# Patient Record
Sex: MALE | Race: WHITE | NOT HISPANIC OR LATINO | URBAN - METROPOLITAN AREA
[De-identification: names, ages, dates, MRNs, and addresses within clinical notes are randomized per-mention and may not be internally consistent; named-entity substitution may affect disease eponyms.]

---

## 2019-08-13 ENCOUNTER — OFFICE (OUTPATIENT)
Dept: URBAN - METROPOLITAN AREA CLINIC 11 | Facility: CLINIC | Age: 29
End: 2019-08-13

## 2019-08-13 VITALS
HEIGHT: 68 IN | SYSTOLIC BLOOD PRESSURE: 138 MMHG | DIASTOLIC BLOOD PRESSURE: 92 MMHG | WEIGHT: 118 LBS | HEART RATE: 70 BPM

## 2019-08-13 DIAGNOSIS — R63.0 ANOREXIA: ICD-10-CM

## 2019-08-13 DIAGNOSIS — R63.4 ABNORMAL WEIGHT LOSS: ICD-10-CM

## 2019-08-13 DIAGNOSIS — B19.20 UNSPECIFIED VIRAL HEPATITIS C WITHOUT HEPATIC COMA: ICD-10-CM

## 2019-08-13 DIAGNOSIS — R11.0 NAUSEA: ICD-10-CM

## 2019-08-13 LAB
HCV RNA BY PCR, QN RFX GENO: HCV GENOTYPE: (no result)
HCV RNA BY PCR, QN RFX GENO: HCV LOG10: 6.16 LOG10 IU/ML
HCV RNA BY PCR, QN RFX GENO: HEPATITIS C QUANTITATION: (no result) IU/ML
HCV RNA BY PCR, QN RFX GENO: TEST INFORMATION: (no result)
HEPATITIS C GENOTYPE: (no result)
HEPATITIS C GENOTYPE: PLEASE NOTE: (no result)
PANEL 083935: HIV SCREEN 4TH GENERATION WRFX: NON REACTIVE

## 2019-08-13 PROCEDURE — 99203 OFFICE O/P NEW LOW 30 MIN: CPT | Performed by: NURSE PRACTITIONER

## 2019-08-13 NOTE — SERVICEHPINOTES
Zoran Galvan   is a   29   year old  male   here today at the request of Dr. Padron for evaluation of a positive hepatitis C antibody.  The patient presented to [facility] emergency room 7/25/19 due to dizziness and low blood pressure. During that ER visit, he was noted to have elevated liver enzymes with an ALT of 124 and an AST of 54. No specific diagnosis was made at that time but he has seen a primary care provider subsequently and had additional labs that showed an ALT of 104 and an AST of 42. His platelet count was 235. He also had a positive hepatitis C antibody.  His mother notes that he was previously vaccinated for hepatitis-B.  The patient admits to having a tattoo placed at a non licensed parlor about 10 or 15 years ago and began using IV drugs 10 years ago.  He stopped all IV drugs about 2 years ago.  He denies any history of blood transfusions or at risk sexual behaviors.  There is no family history of chronic liver disease. He cannot recall when he last had laboratory studies drawn but has never been previously told his liver enzymes were elevated.  He continues to experience the dizziness that brought him to the emergency room and reportedly had an echocardiogram that was negative.  He has not seen a cardiologist.  He notes ringing in his ears and nausea when his stomach is empty and also when he 1st starts eating.  He describes anorexia and has lost more than 10 lb over the last 2 weeks.  He denies heartburn, reflux or dysphagia.  He denies any change in his bowel habits, melena or hematochezia.  There is no family history of colon cancer polyps or inflammatory bowel disease. The patient stopped smoking cigarettes but does continue to vape.

## 2020-09-30 ENCOUNTER — OFFICE (OUTPATIENT)
Dept: URBAN - METROPOLITAN AREA CLINIC 14 | Facility: CLINIC | Age: 30
End: 2020-09-30

## 2021-01-04 ENCOUNTER — OFFICE (OUTPATIENT)
Dept: URBAN - METROPOLITAN AREA CLINIC 14 | Facility: CLINIC | Age: 31
End: 2021-01-04

## 2021-01-07 ENCOUNTER — OFFICE (OUTPATIENT)
Dept: URBAN - METROPOLITAN AREA CLINIC 14 | Facility: CLINIC | Age: 31
End: 2021-01-07

## 2021-01-07 VITALS
DIASTOLIC BLOOD PRESSURE: 80 MMHG | WEIGHT: 136 LBS | RESPIRATION RATE: 16 BRPM | SYSTOLIC BLOOD PRESSURE: 127 MMHG | HEART RATE: 71 BPM | HEIGHT: 68 IN | OXYGEN SATURATION: 100 %

## 2021-01-07 DIAGNOSIS — B19.20 UNSPECIFIED VIRAL HEPATITIS C WITHOUT HEPATIC COMA: ICD-10-CM

## 2021-01-07 PROCEDURE — 99212 OFFICE O/P EST SF 10 MIN: CPT | Performed by: NURSE PRACTITIONER

## 2022-03-07 ENCOUNTER — OFFICE (OUTPATIENT)
Dept: URBAN - METROPOLITAN AREA CLINIC 14 | Facility: CLINIC | Age: 32
End: 2022-03-07

## 2022-03-07 VITALS
HEIGHT: 68 IN | SYSTOLIC BLOOD PRESSURE: 137 MMHG | HEART RATE: 79 BPM | WEIGHT: 124 LBS | OXYGEN SATURATION: 100 % | DIASTOLIC BLOOD PRESSURE: 84 MMHG

## 2022-03-07 DIAGNOSIS — B19.20 UNSPECIFIED VIRAL HEPATITIS C WITHOUT HEPATIC COMA: ICD-10-CM

## 2022-03-07 DIAGNOSIS — R63.4 ABNORMAL WEIGHT LOSS: ICD-10-CM

## 2022-03-07 DIAGNOSIS — R11.0 NAUSEA: ICD-10-CM

## 2022-03-07 PROCEDURE — 99214 OFFICE O/P EST MOD 30 MIN: CPT | Performed by: NURSE PRACTITIONER

## 2022-03-07 RX ORDER — DEXLANSOPRAZOLE 60 MG/1
60 CAPSULE, DELAYED RELEASE ORAL
Qty: 20 | Refills: 0 | Status: ACTIVE
Start: 2022-03-07